# Patient Record
Sex: MALE | Race: ASIAN | NOT HISPANIC OR LATINO
[De-identification: names, ages, dates, MRNs, and addresses within clinical notes are randomized per-mention and may not be internally consistent; named-entity substitution may affect disease eponyms.]

---

## 2020-03-09 PROBLEM — Z00.00 ENCOUNTER FOR PREVENTIVE HEALTH EXAMINATION: Status: ACTIVE | Noted: 2020-03-09

## 2020-03-12 ENCOUNTER — APPOINTMENT (OUTPATIENT)
Dept: THORACIC SURGERY | Facility: CLINIC | Age: 60
End: 2020-03-12
Payer: MEDICAID

## 2020-03-12 VITALS
RESPIRATION RATE: 17 BRPM | SYSTOLIC BLOOD PRESSURE: 111 MMHG | TEMPERATURE: 97.5 F | HEART RATE: 69 BPM | DIASTOLIC BLOOD PRESSURE: 69 MMHG | WEIGHT: 183 LBS | BODY MASS INDEX: 28.72 KG/M2 | OXYGEN SATURATION: 98 % | HEIGHT: 67 IN

## 2020-03-12 DIAGNOSIS — Z72.89 OTHER PROBLEMS RELATED TO LIFESTYLE: ICD-10-CM

## 2020-03-12 DIAGNOSIS — Z86.79 PERSONAL HISTORY OF OTHER DISEASES OF THE CIRCULATORY SYSTEM: ICD-10-CM

## 2020-03-12 DIAGNOSIS — F17.200 NICOTINE DEPENDENCE, UNSPECIFIED, UNCOMPLICATED: ICD-10-CM

## 2020-03-12 PROCEDURE — 99205 OFFICE O/P NEW HI 60 MIN: CPT

## 2020-03-17 PROBLEM — Z72.89 ALCOHOL USE: Status: ACTIVE | Noted: 2020-03-17

## 2020-03-17 PROBLEM — Z86.79 HISTORY OF HYPERTENSION: Status: RESOLVED | Noted: 2020-03-17 | Resolved: 2020-03-17

## 2020-03-17 PROBLEM — F17.200 CURRENT SMOKER: Status: ACTIVE | Noted: 2020-03-17

## 2020-03-17 RX ORDER — UMECLIDINIUM BROMIDE AND VILANTEROL TRIFENATATE 62.5; 25 UG/1; UG/1
62.5-25 POWDER RESPIRATORY (INHALATION)
Refills: 0 | Status: ACTIVE | COMMUNITY

## 2020-03-17 RX ORDER — LOSARTAN POTASSIUM AND HYDROCHLOROTHIAZIDE 12.5; 5 MG/1; MG/1
50-12.5 TABLET ORAL
Refills: 0 | Status: ACTIVE | COMMUNITY

## 2020-03-18 NOTE — CONSULT LETTER
[Dear  ___] : Dear  [unfilled], [Consult Letter:] : I had the pleasure of evaluating your patient, [unfilled]. [( Thank you for referring [unfilled] for consultation for _____ )] : Thank you for referring [unfilled] for consultation for [unfilled] [Please see my note below.] : Please see my note below. [Consult Closing:] : Thank you very much for allowing me to participate in the care of this patient.  If you have any questions, please do not hesitate to contact me. [Sincerely,] : Sincerely, [DrSamm  ___] : Dr. BLAKE [FreeTextEntry2] : Scott Caballero MD (Pulm/ref)\gallo Ronquillo MD (PCP) [FreeTextEntry3] : Ghassan Haque MD, MPH \par System Director of Thoracic Surgery \par Director of Comprehensive Lung and Foregut [place] \par Professor Cardiovascular & Thoracic Surgery  \par [facility] [employer] at [facility]\par

## 2020-03-18 NOTE — DATA REVIEWED
[FreeTextEntry1] : CT Chest on 12/8/19:\par - there are ill-defined small randomly distributed nodules in JAY, including a confluent 2.2 cm area of masslike opacity in medial apical region\par - minimal patchy scarring noted in both lungs\par \par PET/CT on 3/4/20:\par - hypermetabolic focus in the superior pole of the Rt thyroid lobe with SUV=8.6\par - 1.6 cm nodule with irregular margins in the Lt apex, SUVmax=14.3 (4: 43), adjacent to it, multiple confluent opacity, SUV=5.3\par - a few small areas of subsegmental atelectasis/scarring in both lungs\par - some nonspecific foci of hypermetabolic in the mediastinum and hilar. SUV=2.8 to Rt paratracheal/precarinal LN, possibly reactive\par - scattered findings of colonic diverticulosis w/o diverticulitis, worse in the sigmoid colon\par \par PFTs on 3/9/20: FVC 87%, FEV1 74%, DLCO 73%

## 2020-03-18 NOTE — ASSESSMENT
[FreeTextEntry1] : Mr. MARTIN WOMACK, 60 year old male, current smoker, w/ hx of HTN, who presented to PCP for routine physical exam, sent for CT lung cancer screening due to smoking hx.\par \par CT Chest on 12/8/19:\par - there are ill-defined small randomly distributed nodules in JAY, including a confluent 2.2 cm area of masslike opacity in medial apical region\par - minimal patchy scarring noted in both lungs\par \par PET/CT on 3/4/20:\par - hypermetabolic focus in the superior pole of the Rt thyroid lobe with SUV=8.6\par - 1.6 cm nodule with irregular margins in the Lt apex, SUVmax=14.3 (4: 43), adjacent to it, multiple confluent opacity, SUV=5.3\par - a few small areas of subsegmental atelectasis/scarring in both lungs\par - some nonspecific foci of hypermetabolic in the mediastinum and hilar. SUV=2.8 to Rt paratracheal/precarinal LN, possibly reactive\par - scattered findings of colonic diverticulosis w/o diverticulitis, worse in the sigmoid colon\par \par I have reviewed the patient's medical records and diagnostic images at time of this office consultation and have made the following recommendation:\par 1. FNA of JAY\par 2. RTC to review result/path, will discuss about Lt VATS Robotic-assisted, JAY wedge rxn, possible LULobectomy, MLND.\par \par \par I personally performed the services described in the documentation, reviewed the documentation recorded by the scribe in my presence and it accurately and completely records my words and actions. \par \par I, Teddy Freeman, SYMONE, am scribing for and the presence of Dr. Ghassan Haque the following sections, HISTORY OF PRESENT ILLNESS, PAST MEDICAL/FAMILY/SOCIAL HISTORY; REVIEW OF SYSTEMS; VITAL SIGNS; PHYSICAL EXAM; DISPOSITION.\par

## 2020-03-18 NOTE — HISTORY OF PRESENT ILLNESS
[FreeTextEntry1] : Mr. MARTIN WOMACK, 60 year old male, current smoker, w/ hx of HTN, who presented to PCP for routine physical exam, sent for CT lung cancer screening due to smoking hx.\par \par CT Chest on 12/8/19:\par - there are ill-defined small randomly distributed nodules in JAY, including a confluent 2.2 cm area of masslike opacity in medial apical region\par - minimal patchy scarring noted in both lungs\par \par Completed a course of Clarithromycin 500mg PO BID x 10 days in Jan 2020.\par \par PET/CT on 3/4/20:\par - hypermetabolic focus in the superior pole of the Rt thyroid lobe with SUV=8.6\par - 1.6 cm nodule with irregular margins in the Lt apex, SUVmax=14.3 (4: 43), adjacent to it, multiple confluent opacity, SUV=5.3\par - a few small areas of subsegmental atelectasis/scarring in both lungs\par - some nonspecific foci of hypermetabolic in the mediastinum and hilar. SUV=2.8 to Rt paratracheal/precarinal LN, possibly reactive\par - scattered findings of colonic diverticulosis w/o diverticulitis, worse in the sigmoid colon\par \par PFTs on 3/9/20: FVC 87%, FEV1 74%, DLCO 73%\par \par AFB x3 from Feb 2020 are negative. \par \par Pt presents today for CT Sx consultation, referred by Dr. Scott Caballero. Denies SOB, CP, cough.\par

## 2022-08-18 ENCOUNTER — APPOINTMENT (OUTPATIENT)
Dept: THORACIC SURGERY | Facility: CLINIC | Age: 62
End: 2022-08-18

## 2022-08-18 VITALS
BODY MASS INDEX: 29.38 KG/M2 | OXYGEN SATURATION: 98 % | HEART RATE: 72 BPM | RESPIRATION RATE: 18 BRPM | DIASTOLIC BLOOD PRESSURE: 81 MMHG | WEIGHT: 187.6 LBS | TEMPERATURE: 98 F | SYSTOLIC BLOOD PRESSURE: 124 MMHG

## 2022-08-18 PROCEDURE — 99215 OFFICE O/P EST HI 40 MIN: CPT

## 2022-08-22 NOTE — ASSESSMENT
[FreeTextEntry1] : Mr. MARTIN WOMACK, 62 year old male, current smoker, w/ hx of HTN, who presented to PCP for routine physical exam, sent for CT lung cancer screening due to smoking hx.\par \par CT Chest on 12/8/19:\par - there are ill-defined small randomly distributed nodules in JAY, including a confluent 2.2 cm area of masslike opacity in medial apical region\par - minimal patchy scarring noted in both lungs\par \par Completed a course of Clarithromycin 500mg PO BID x 10 days in Jan 2020.\par \par PET/CT on 3/4/20:\par - hypermetabolic focus in the superior pole of the Rt thyroid lobe with SUV=8.6\par - 1.6 cm nodule with irregular margins in the Lt apex, SUVmax=14.3 (4: 43), adjacent to it, multiple confluent opacity, SUV=5.3\par - a few small areas of subsegmental atelectasis/scarring in both lungs\par - some nonspecific foci of hypermetabolic in the mediastinum and hilar. SUV=2.8 to Rt paratracheal/precarinal LN, possibly reactive\par - scattered findings of colonic diverticulosis w/o diverticulitis, worse in the sigmoid colon\par \par PFTs on 3/9/20: FVC 87%, FEV1 74%, DLCO 73%\par \par AFB x3 from Feb 2020 are negative. \par \par Pt lost follow up due to pandemic. \par \par CT Chest on 8/7/22:\par - again noted confluent reticulonodular opacities within the JAY predominant in a peribronchial distribution extending from the suprahilar region to the apex with interval increased in solid component, 1.3 cm now, previously 8 mm\par - JAY subpleural nodule is smaller 1.2 cm, previously 1.6 cm \par \par I have reviewed the patient's medical records and diagnostic images at time of this office consultation and have made the following recommendation:\par 1. CT reviewed, interval increased in size JAY lung nodule, concerning for malignancy. I would like to refer pt to Dr. Resendez for navigational bronchoscopy. \par 2. PFTs\par 3. RTC after bronch\par \par \par I personally performed the services described in the documentation, reviewed the documentation recorded by the scribe in my presence and it accurately and completely records my words and actions. \par \par I, Brigid Ibrahim NP, am scribing for and the presence of LIS Prajapati, the following sections HISTORY OF PRESENT ILLNESS, PAST MEDICAL/FAMILY/SOCIAL HISTORY; REVIEW OF SYSTEMS; VITAL SIGNS; PHYSICAL EXAM; DISPOSITION.\par

## 2022-08-22 NOTE — HISTORY OF PRESENT ILLNESS
[FreeTextEntry1] : Mr. MARTIN WOMACK, 62 year old male, current smoker, w/ hx of HTN, who presented to PCP for routine physical exam, sent for CT lung cancer screening due to smoking hx.\par \par CT Chest on 12/8/19:\par - there are ill-defined small randomly distributed nodules in JAY, including a confluent 2.2 cm area of masslike opacity in medial apical region\par - minimal patchy scarring noted in both lungs\par \par Completed a course of Clarithromycin 500mg PO BID x 10 days in Jan 2020.\par \par PET/CT on 3/4/20:\par - hypermetabolic focus in the superior pole of the Rt thyroid lobe with SUV=8.6\par - 1.6 cm nodule with irregular margins in the Lt apex, SUVmax=14.3 (4: 43), adjacent to it, multiple confluent opacity, SUV=5.3\par - a few small areas of subsegmental atelectasis/scarring in both lungs\par - some nonspecific foci of hypermetabolic in the mediastinum and hilar. SUV=2.8 to Rt paratracheal/precarinal LN, possibly reactive\par - scattered findings of colonic diverticulosis w/o diverticulitis, worse in the sigmoid colon\par \par PFTs on 3/9/20: FVC 87%, FEV1 74%, DLCO 73%\par \par AFB x3 from Feb 2020 are negative. \par \par Pt lost follow up due to pandemic. \par \par CT Chest on 8/7/22:\par - again noted confluent reticulonodular opacities within the JAY predominant in a peribronchial distribution extending from the suprahilar region to the apex with interval increased in solid component, 1.3 cm now, previously 8 mm\par - JAY subpleural nodule is smaller 1.2 cm, previously 1.6 cm \par \par Pt presents today for follow up.\par \par

## 2022-08-22 NOTE — CONSULT LETTER
[Dear  ___] : Dear  [unfilled], [Consult Letter:] : I had the pleasure of evaluating your patient, [unfilled]. [( Thank you for referring [unfilled] for consultation for _____ )] : Thank you for referring [unfilled] for consultation for [unfilled] [Please see my note below.] : Please see my note below. [Consult Closing:] : Thank you very much for allowing me to participate in the care of this patient.  If you have any questions, please do not hesitate to contact me. [Sincerely,] : Sincerely, [DrSamm  ___] : Dr. BLAKE [FreeTextEntry2] : Scott Caballero MD (Pulm/ref)\gallo Ronquillo MD (PCP) [FreeTextEntry3] : Ghassan Haque MD, MPH \par System Director of Thoracic Surgery \par Director of Comprehensive Lung and Foregut [place] \par Professor Cardiovascular & Thoracic Surgery  \par [facility] [employer] at [facility]\par

## 2022-09-12 ENCOUNTER — APPOINTMENT (OUTPATIENT)
Dept: PULMONOLOGY | Facility: CLINIC | Age: 62
End: 2022-09-12

## 2022-09-12 VITALS
DIASTOLIC BLOOD PRESSURE: 80 MMHG | SYSTOLIC BLOOD PRESSURE: 143 MMHG | OXYGEN SATURATION: 97 % | BODY MASS INDEX: 29.82 KG/M2 | TEMPERATURE: 98.3 F | HEIGHT: 67 IN | HEART RATE: 80 BPM | WEIGHT: 190 LBS

## 2022-09-12 PROCEDURE — 99205 OFFICE O/P NEW HI 60 MIN: CPT

## 2022-09-15 LAB
ANION GAP SERPL CALC-SCNC: 24 MMOL/L
BASOPHILS # BLD AUTO: 0.04 K/UL
BASOPHILS NFR BLD AUTO: 0.6 %
BUN SERPL-MCNC: 18 MG/DL
CALCIUM SERPL-MCNC: 10 MG/DL
CHLORIDE SERPL-SCNC: 101 MMOL/L
CO2 SERPL-SCNC: 17 MMOL/L
CREAT SERPL-MCNC: 0.78 MG/DL
EGFR: 102 ML/MIN/1.73M2
EOSINOPHIL # BLD AUTO: 0.08 K/UL
EOSINOPHIL NFR BLD AUTO: 1.3 %
GLUCOSE SERPL-MCNC: 82 MG/DL
HCT VFR BLD CALC: 45.9 %
HGB BLD-MCNC: 14.7 G/DL
IMM GRANULOCYTES NFR BLD AUTO: 0.5 %
LYMPHOCYTES # BLD AUTO: 1.89 K/UL
LYMPHOCYTES NFR BLD AUTO: 29.8 %
MAN DIFF?: NORMAL
MCHC RBC-ENTMCNC: 32 GM/DL
MCHC RBC-ENTMCNC: 32.9 PG
MCV RBC AUTO: 102.7 FL
MONOCYTES # BLD AUTO: 0.47 K/UL
MONOCYTES NFR BLD AUTO: 7.4 %
NEUTROPHILS # BLD AUTO: 3.84 K/UL
NEUTROPHILS NFR BLD AUTO: 60.4 %
PLATELET # BLD AUTO: 207 K/UL
POTASSIUM SERPL-SCNC: 4.3 MMOL/L
RBC # BLD: 4.47 M/UL
RBC # FLD: 12.7 %
SODIUM SERPL-SCNC: 141 MMOL/L
WBC # FLD AUTO: 6.35 K/UL

## 2022-09-19 NOTE — CONSULT LETTER
[Dear  ___] : Dear  [unfilled], [Consult Letter:] : I had the pleasure of evaluating your patient, [unfilled]. [Please see my note below.] : Please see my note below. [Consult Closing:] : Thank you very much for allowing me to participate in the care of this patient.  If you have any questions, please do not hesitate to contact me. [Sincerely,] : Sincerely, [FreeTextEntry3] : Ehsan Resendez MD\par Director of Interventional Pulmonology & Bronchoscopy\par . \par Division of Pulmonary, Critical Care & Sleep Medicine\par [facility] [employer] at [facility].\par \par

## 2022-09-19 NOTE — ASSESSMENT
[FreeTextEntry1] : 62 M PMH active tobacco use and HTN presenting for evaluation of JAY opacity. Currently asymptomatic though with enlarging nodule and previous uptake on prior pet CT. Differential at this time is concerning for malignant vs. infectious vs. inflammatory\par \par - Will assess to ensure the CT is compatible with the navigation software. \par - will schedule for navigational bronchoscopy with EBUS and lymph node biopsy\par - pre-operative labs\par - PFTs 8/2022 reviewed; preserved FEV1/FVC with mildly reduced DLCO\par \par \par The diagnostic yield of robotic assisted navigation assisted bronchoscopy with biopsy as well as EBUS with biopsy was discussed with the patient. The risk and benefits of bronchoscopy with navigation assisted transbronchial biopsy including risk of bleeding and  risk pneumothorax was discussed with the patient and they demonstrated understanding. In case of pneumothorax, we discussed the need for in hospital monitoring and chest tube placement. In case of bleeding, we explained that they may require inpatient or ICU admission. In case the lesion is suspicious for malignancy on preliminary or there is mediastinal or hilar adenopathy, the patient will need staging of the mediastinum with EBUS guided TBNA in the same procedure. The risk and benefits of EBUS including the risk of bleeding and risk of pneumothorax associated with EBUS was discussed with the patient and he demonstrated understanding. We will also send the tissue/BAL for culture to assess for infectious etiology during the procedure. The patient is agreeable to proceed with a navigation assisted bronchoscopy with biopsy of the lung lesion and EBUS with TBNA. The patient will undergo PST to assess candidacy of general anesthesia as well as discuss the risks and benefits with the anesthesiologist. Educational reading material regarding the procedure, risks and benefits provided to the patient in paper format. \par \par Will need COVID swab and presurgical testing prior to scheduling the procedure. The office will co-ordinate testing and pre-surgical appointment.\par \par \par \par

## 2022-09-19 NOTE — REVIEW OF SYSTEMS
[Negative] : Endocrine [Fever] : no fever [Dry Eyes] : no dry eyes [Cough] : no cough [Sputum] : no sputum [Dyspnea] : no dyspnea [Chest Discomfort] : no chest discomfort [Orthopnea] : no orthopnea [Palpitations] : no palpitations [Depression] : no depression [Diabetes] : no diabetes

## 2022-09-19 NOTE — DISCUSSION/SUMMARY
[FreeTextEntry1] : The patients most recent CT scan was reviewed by my independently and my interpretation is stated below:\par \par CT Chest on 12/8/19:\par - there are ill-defined small randomly distributed nodules in JAY, including a confluent 2.2 cm area of masslike opacity in medial apical region\par - minimal patchy scarring noted in both lungs\par \par Completed a course of Clarithromycin 500mg PO BID x 10 days in Jan 2020.\par \par PET/CT on 3/4/20:\par - hypermetabolic focus in the superior pole of the Rt thyroid lobe with SUV=8.6\par - 1.6 cm nodule with irregular margins in the Lt apex, SUVmax=14.3 (4: 43), adjacent to it, multiple confluent opacity, SUV=5.3\par - a few small areas of subsegmental atelectasis/scarring in both lungs\par - some nonspecific foci of hypermetabolic in the mediastinum and hilar. SUV=2.8 to Rt paratracheal/precarinal LN, possibly reactive\par - scattered findings of colonic diverticulosis w/o diverticulitis, worse in the sigmoid colon\par

## 2022-09-19 NOTE — HISTORY OF PRESENT ILLNESS
[Former] : former [TextBox_4] : Interventional Pulmonology Consultation/Visit Note\par \par \par 62 M PMH active tobacco use and HTN presenting for evaluation of JAY opacity. Patient initially seen by PCP for routine physical exam and underwent CT chest on 12/8/19 with evidence of confluent 2.2cm masslike opacity in JAY with small ill defined nodules. PET CT from March 2020 with hypermetabolic focus in Rt thyroid lobe with uptake in 1.6cm nodule in left apex with enhancement of adjacent confluent opacity as well as nonspecific foci of hypermetabolic mediastinal/hilar nodes. Lost to follow up due to pandemic though repeat CT 8/7/22 showing confluent reticulonodular opacities in JAY with interval increase in solid component from 8mm to 13mm. Referred by Dr. Haque for navigational bronchoscopy. \par Currently reports that he is asymptomatic, no fevers, chills, shortness of breath, cough, chest pain, weight loss, cough. \par \par Born in [place] emigrated in [place]. No recent travel. No history of childhood infections. Denies TB history. No Fhx of lung conditions. \par \par Shx \par 1ppd x since age 22

## 2022-10-04 ENCOUNTER — APPOINTMENT (OUTPATIENT)
Dept: PULMONOLOGY | Facility: HOSPITAL | Age: 62
End: 2022-10-04

## 2022-10-05 ENCOUNTER — NON-APPOINTMENT (OUTPATIENT)
Age: 62
End: 2022-10-05

## 2022-10-11 ENCOUNTER — APPOINTMENT (OUTPATIENT)
Dept: PULMONOLOGY | Facility: HOSPITAL | Age: 62
End: 2022-10-11

## 2022-12-01 ENCOUNTER — OUTPATIENT (OUTPATIENT)
Dept: OUTPATIENT SERVICES | Facility: HOSPITAL | Age: 62
LOS: 1 days | End: 2022-12-01

## 2022-12-01 VITALS
HEART RATE: 66 BPM | DIASTOLIC BLOOD PRESSURE: 84 MMHG | TEMPERATURE: 97 F | HEIGHT: 65 IN | WEIGHT: 186.95 LBS | RESPIRATION RATE: 16 BRPM | OXYGEN SATURATION: 98 % | SYSTOLIC BLOOD PRESSURE: 146 MMHG

## 2022-12-01 DIAGNOSIS — I10 ESSENTIAL (PRIMARY) HYPERTENSION: ICD-10-CM

## 2022-12-01 DIAGNOSIS — R91.8 OTHER NONSPECIFIC ABNORMAL FINDING OF LUNG FIELD: ICD-10-CM

## 2022-12-01 DIAGNOSIS — R59.0 LOCALIZED ENLARGED LYMPH NODES: ICD-10-CM

## 2022-12-01 DIAGNOSIS — Z98.890 OTHER SPECIFIED POSTPROCEDURAL STATES: Chronic | ICD-10-CM

## 2022-12-01 LAB
ALBUMIN SERPL ELPH-MCNC: 4.7 G/DL — SIGNIFICANT CHANGE UP (ref 3.3–5)
ALP SERPL-CCNC: 65 U/L — SIGNIFICANT CHANGE UP (ref 40–120)
ALT FLD-CCNC: 43 U/L — HIGH (ref 4–41)
ANION GAP SERPL CALC-SCNC: 12 MMOL/L — SIGNIFICANT CHANGE UP (ref 7–14)
AST SERPL-CCNC: 43 U/L — HIGH (ref 4–40)
BILIRUB SERPL-MCNC: 0.4 MG/DL — SIGNIFICANT CHANGE UP (ref 0.2–1.2)
BUN SERPL-MCNC: 16 MG/DL — SIGNIFICANT CHANGE UP (ref 7–23)
CALCIUM SERPL-MCNC: 9.7 MG/DL — SIGNIFICANT CHANGE UP (ref 8.4–10.5)
CHLORIDE SERPL-SCNC: 100 MMOL/L — SIGNIFICANT CHANGE UP (ref 98–107)
CO2 SERPL-SCNC: 26 MMOL/L — SIGNIFICANT CHANGE UP (ref 22–31)
CREAT SERPL-MCNC: 0.75 MG/DL — SIGNIFICANT CHANGE UP (ref 0.5–1.3)
EGFR: 103 ML/MIN/1.73M2 — SIGNIFICANT CHANGE UP
GLUCOSE SERPL-MCNC: 70 MG/DL — SIGNIFICANT CHANGE UP (ref 70–99)
HCT VFR BLD CALC: 46.3 % — SIGNIFICANT CHANGE UP (ref 39–50)
HGB BLD-MCNC: 15.5 G/DL — SIGNIFICANT CHANGE UP (ref 13–17)
MCHC RBC-ENTMCNC: 33.4 PG — SIGNIFICANT CHANGE UP (ref 27–34)
MCHC RBC-ENTMCNC: 33.5 GM/DL — SIGNIFICANT CHANGE UP (ref 32–36)
MCV RBC AUTO: 99.8 FL — SIGNIFICANT CHANGE UP (ref 80–100)
NRBC # BLD: 0 /100 WBCS — SIGNIFICANT CHANGE UP (ref 0–0)
NRBC # FLD: 0 K/UL — SIGNIFICANT CHANGE UP (ref 0–0)
PLATELET # BLD AUTO: 214 K/UL — SIGNIFICANT CHANGE UP (ref 150–400)
POTASSIUM SERPL-MCNC: 4.5 MMOL/L — SIGNIFICANT CHANGE UP (ref 3.5–5.3)
POTASSIUM SERPL-SCNC: 4.5 MMOL/L — SIGNIFICANT CHANGE UP (ref 3.5–5.3)
PROT SERPL-MCNC: 7.8 G/DL — SIGNIFICANT CHANGE UP (ref 6–8.3)
RBC # BLD: 4.64 M/UL — SIGNIFICANT CHANGE UP (ref 4.2–5.8)
RBC # FLD: 11.9 % — SIGNIFICANT CHANGE UP (ref 10.3–14.5)
SODIUM SERPL-SCNC: 138 MMOL/L — SIGNIFICANT CHANGE UP (ref 135–145)
WBC # BLD: 5.37 K/UL — SIGNIFICANT CHANGE UP (ref 3.8–10.5)
WBC # FLD AUTO: 5.37 K/UL — SIGNIFICANT CHANGE UP (ref 3.8–10.5)

## 2022-12-01 RX ORDER — SODIUM CHLORIDE 9 MG/ML
1000 INJECTION, SOLUTION INTRAVENOUS
Refills: 0 | Status: DISCONTINUED | OUTPATIENT
Start: 2022-12-13 | End: 2022-12-27

## 2022-12-01 NOTE — H&P PST ADULT - ACTIVITY
Patient continues to be admitted to SNF at [facility] with date of discharge undetermined. Patient has been out of Acute care for greated than 30 days and remains in SNF. Episode closed at this time. Pt states he can walk 3 to 4 blocks or climb 2 flights stairs without SOB or CP

## 2022-12-01 NOTE — H&P PST ADULT - HISTORY OF PRESENT ILLNESS
Pt is  Pt is a 62 yr old male Mandarin speaking who is scheduled for [place] Robotic Navigational Endobronchial U/S Bronchoscopy Radial Endobronchial U/S with cytology with Dr Resendez tentatively 12/13/22 - pt seen with daughter who assisted with translation - pt had CT scan 2019 and JAY mass noted - pt has smoking hx - 2020 pt also noted to have thyroid and mediastinal node involvement and now for biopsy - pt denies SOB or cough. Hx HTN -   Pt postponed from same procedure in 10/22 needing Cardio evaluation - CC in chart now   Pt given information for COVID PCR testing sites and told to make appointment 3-5 days preop

## 2022-12-01 NOTE — H&P PST ADULT - PROBLEM SELECTOR PLAN 1
Pt scheduled for surgery and preop instructions including instructions for taking Famotidine on the day of surgery, given verbally and with use of  written materials, and patient confirming understanding of such instructions using  teach back method.  Pt given information for COVID PCR testing sites and told to make appointment 3-5 days preop

## 2022-12-01 NOTE — H&P PST ADULT - LAST STRESS TEST
did not have in 10/22 but recommended if further surgery needed  - clearance given for current procedure

## 2022-12-12 ENCOUNTER — TRANSCRIPTION ENCOUNTER (OUTPATIENT)
Age: 62
End: 2022-12-12

## 2022-12-13 ENCOUNTER — TRANSCRIPTION ENCOUNTER (OUTPATIENT)
Age: 62
End: 2022-12-13

## 2022-12-13 ENCOUNTER — APPOINTMENT (OUTPATIENT)
Dept: PULMONOLOGY | Facility: HOSPITAL | Age: 62
End: 2022-12-13

## 2022-12-13 ENCOUNTER — RESULT REVIEW (OUTPATIENT)
Age: 62
End: 2022-12-13

## 2022-12-13 ENCOUNTER — OUTPATIENT (OUTPATIENT)
Dept: OUTPATIENT SERVICES | Facility: HOSPITAL | Age: 62
LOS: 1 days | Discharge: HOME | End: 2022-12-13

## 2022-12-13 VITALS
OXYGEN SATURATION: 100 % | RESPIRATION RATE: 16 BRPM | SYSTOLIC BLOOD PRESSURE: 111 MMHG | HEART RATE: 72 BPM | DIASTOLIC BLOOD PRESSURE: 73 MMHG

## 2022-12-13 VITALS
HEIGHT: 65 IN | RESPIRATION RATE: 16 BRPM | OXYGEN SATURATION: 100 % | SYSTOLIC BLOOD PRESSURE: 135 MMHG | TEMPERATURE: 98 F | WEIGHT: 186.95 LBS | HEART RATE: 73 BPM | DIASTOLIC BLOOD PRESSURE: 79 MMHG

## 2022-12-13 DIAGNOSIS — R59.0 LOCALIZED ENLARGED LYMPH NODES: ICD-10-CM

## 2022-12-13 DIAGNOSIS — Z98.890 OTHER SPECIFIED POSTPROCEDURAL STATES: Chronic | ICD-10-CM

## 2022-12-13 LAB
B PERT IGG+IGM PNL SER: ABNORMAL
COLOR FLD: ABNORMAL
EOSINOPHIL # FLD: 2 % — SIGNIFICANT CHANGE UP
LYMPHOCYTES # FLD: 13 % — SIGNIFICANT CHANGE UP
MESOTHL CELL # FLD: 15 % — SIGNIFICANT CHANGE UP
MONOS+MACROS # FLD: 27 % — SIGNIFICANT CHANGE UP
NEUTROPHILS-BODY FLUID: 14 % — SIGNIFICANT CHANGE UP
NIGHT BLUE STAIN TISS: SIGNIFICANT CHANGE UP
OTHER CELLS FLD MANUAL: 29 % — SIGNIFICANT CHANGE UP
RCV VOL RI: SIGNIFICANT CHANGE UP CELLS/UL (ref 0–5)
SPECIMEN SOURCE FLD: SIGNIFICANT CHANGE UP
SPECIMEN SOURCE: SIGNIFICANT CHANGE UP
TOTAL NUCLEATED CELL COUNT, BODY FLUID: SIGNIFICANT CHANGE UP CELLS/UL (ref 0–5)

## 2022-12-13 PROCEDURE — 31629 BRONCHOSCOPY/NEEDLE BX EACH: CPT | Mod: GC

## 2022-12-13 PROCEDURE — 71045 X-RAY EXAM CHEST 1 VIEW: CPT | Mod: 26

## 2022-12-13 PROCEDURE — 31628 BRONCHOSCOPY/LUNG BX EACH: CPT | Mod: GC

## 2022-12-13 PROCEDURE — 31627 NAVIGATIONAL BRONCHOSCOPY: CPT | Mod: GC

## 2022-12-13 PROCEDURE — 31653 BRONCH EBUS SAMPLNG 3/> NODE: CPT | Mod: GC

## 2022-12-13 PROCEDURE — 31654 BRONCH EBUS IVNTJ PERPH LES: CPT | Mod: GC

## 2022-12-13 PROCEDURE — 88172 CYTP DX EVAL FNA 1ST EA SITE: CPT | Mod: 26

## 2022-12-13 PROCEDURE — 31624 DX BRONCHOSCOPE/LAVAGE: CPT | Mod: GC

## 2022-12-13 PROCEDURE — 88112 CYTOPATH CELL ENHANCE TECH: CPT | Mod: 26,59

## 2022-12-13 PROCEDURE — 31645 BRNCHSC W/THER ASPIR 1ST: CPT | Mod: GC

## 2022-12-13 PROCEDURE — 88305 TISSUE EXAM BY PATHOLOGIST: CPT | Mod: 26

## 2022-12-13 PROCEDURE — 88173 CYTOPATH EVAL FNA REPORT: CPT | Mod: 26

## 2022-12-13 RX ORDER — LOSARTAN/HYDROCHLOROTHIAZIDE 100MG-25MG
1 TABLET ORAL
Qty: 0 | Refills: 0 | DISCHARGE

## 2022-12-13 RX ORDER — FENTANYL CITRATE 50 UG/ML
25 INJECTION INTRAVENOUS
Refills: 0 | Status: DISCONTINUED | OUTPATIENT
Start: 2022-12-13 | End: 2022-12-13

## 2022-12-13 RX ORDER — ASPIRIN/CALCIUM CARB/MAGNESIUM 324 MG
0 TABLET ORAL
Qty: 0 | Refills: 0 | DISCHARGE

## 2022-12-13 RX ORDER — THIAMINE MONONITRATE (VIT B1) 100 MG
1 TABLET ORAL
Qty: 0 | Refills: 0 | DISCHARGE

## 2022-12-13 RX ORDER — FOLIC ACID 0.8 MG
1 TABLET ORAL
Qty: 0 | Refills: 0 | DISCHARGE

## 2022-12-13 RX ORDER — ONDANSETRON 8 MG/1
4 TABLET, FILM COATED ORAL ONCE
Refills: 0 | Status: DISCONTINUED | OUTPATIENT
Start: 2022-12-13 | End: 2022-12-27

## 2022-12-13 RX ORDER — OXYCODONE HYDROCHLORIDE 5 MG/1
5 TABLET ORAL ONCE
Refills: 0 | Status: DISCONTINUED | OUTPATIENT
Start: 2022-12-13 | End: 2022-12-13

## 2022-12-13 RX ORDER — FENTANYL CITRATE 50 UG/ML
50 INJECTION INTRAVENOUS
Refills: 0 | Status: DISCONTINUED | OUTPATIENT
Start: 2022-12-13 | End: 2022-12-13

## 2022-12-13 NOTE — BRIEF OPERATIVE NOTE - OPERATION/FINDINGS
Tbbx taken of JAY with robotic assisted navigational bronchoscopy. EBUS with lymph node biopsy of 4R, 7 and 11L were performed. Flexible bronchoscopy performed initially, pig bronchus and JAY with anterior, apical and posterior segments noted. Tbbx taken of JAY with robotic assisted navigational bronchoscopy. EBUS with lymph node biopsy of 4R, 7 and 11L were performed. Flexible bronchoscopy performed initially, pig bronchus and JAY with anterior, apical and posterior segments noted. Tbbx taken of JAY with robotic assisted navigational bronchoscopy. EBUS with lymph node biopsy of 4R, 7 and 11L were performed.    #[id number]

## 2022-12-13 NOTE — PACU DISCHARGE NOTE - PAIN:
Controlled with current regime [Arthralgia] : arthralgia [Joint Stiffness] : joint stiffness [Joint Pain] : joint pain [Joint Swelling] : joint swelling

## 2022-12-13 NOTE — ASU DISCHARGE PLAN (ADULT/PEDIATRIC) - NS MD DC FALL RISK RISK
For information on Fall & Injury Prevention, visit: [email or web address] OR  [email or web address] OR  https://www.cdc.gov/steadi/patient.html

## 2022-12-13 NOTE — ASU DISCHARGE PLAN (ADULT/PEDIATRIC) - CARE PROVIDER_API CALL
Ehsan Resendez)  Critical Care Medicine; Internal Medicine; Pulmonary Disease  [address] [place] [address]  Phone: [phone]  Fax: [phone]  Established Patient  Follow Up Time:

## 2022-12-13 NOTE — BRIEF OPERATIVE NOTE - NSICDXBRIEFPROCEDURE_GEN_ALL_CORE_FT
PROCEDURES:  Navigational bronchoscopy 13-Dec-2022 10:09:02  Lejeune, Derek  Endobronchial ultrasound during diagnostic bronchoscopy 13-Dec-2022 10:09:22  Lejeune, Derek  Bronchoscopy, with transbronchial lung biopsy 13-Dec-2022 10:10:18  Lejeune, Derek

## 2022-12-13 NOTE — ASU DISCHARGE PLAN (ADULT/PEDIATRIC) - PROCEDURE
[place] assisted navigational bronchoscopy, lung biopsy, endobronchial ultrasound, lymph node biopsy [place] assisted navigational bronchoscopy, lung biopsy, endobronchial ultrasound, lymph node biopsy

## 2022-12-13 NOTE — ASU DISCHARGE PLAN (ADULT/PEDIATRIC) - CALL YOUR DOCTOR IF YOU HAVE ANY OF THE FOLLOWING:
Shortness of breath, chest pain/Bleeding that does not stop/Pain not relieved by Medications/Fever greater than (need to indicate Fahrenheit or Celsius)/Nausea and vomiting that does not stop/Inability to tolerate liquids or foods

## 2022-12-15 LAB
CULTURE RESULTS: SIGNIFICANT CHANGE UP
SPECIMEN SOURCE: SIGNIFICANT CHANGE UP

## 2022-12-18 LAB — NON-GYNECOLOGICAL CYTOLOGY STUDY: SIGNIFICANT CHANGE UP

## 2022-12-19 PROBLEM — R91.8 OTHER NONSPECIFIC ABNORMAL FINDING OF LUNG FIELD: Chronic | Status: ACTIVE | Noted: 2022-12-01

## 2022-12-19 PROBLEM — I10 ESSENTIAL (PRIMARY) HYPERTENSION: Chronic | Status: ACTIVE | Noted: 2022-12-01

## 2022-12-19 PROBLEM — Z87.891 PERSONAL HISTORY OF NICOTINE DEPENDENCE: Chronic | Status: ACTIVE | Noted: 2022-12-01

## 2022-12-20 PROBLEM — J84.10 GRANULOMATOUS LUNG DISEASE: Status: ACTIVE | Noted: 2022-12-20

## 2022-12-22 ENCOUNTER — APPOINTMENT (OUTPATIENT)
Dept: THORACIC SURGERY | Facility: CLINIC | Age: 62
End: 2022-12-22

## 2022-12-22 VITALS
TEMPERATURE: 97.7 F | OXYGEN SATURATION: 97 % | SYSTOLIC BLOOD PRESSURE: 122 MMHG | HEART RATE: 90 BPM | BODY MASS INDEX: 29.29 KG/M2 | RESPIRATION RATE: 18 BRPM | DIASTOLIC BLOOD PRESSURE: 67 MMHG | WEIGHT: 187 LBS

## 2022-12-22 DIAGNOSIS — J84.10 PULMONARY FIBROSIS, UNSPECIFIED: ICD-10-CM

## 2022-12-22 PROCEDURE — 99214 OFFICE O/P EST MOD 30 MIN: CPT

## 2022-12-23 NOTE — DATA REVIEWED
[FreeTextEntry1] : I have independently reviewed the following:\par navigational bronchoscopy on 12/19/22

## 2022-12-23 NOTE — HISTORY OF PRESENT ILLNESS
[FreeTextEntry1] : Mr. MARTIN WOMACK, 62 year old male, current smoker, w/ hx of HTN, who presented to PCP for routine physical exam, sent for CT lung cancer screening due to smoking hx.\par \par CT Chest on 12/8/19:\par - there are ill-defined small randomly distributed nodules in JAY, including a confluent 2.2 cm area of masslike opacity in medial apical region\par - minimal patchy scarring noted in both lungs\par \par Completed a course of Clarithromycin 500mg PO BID x 10 days in Jan 2020.\par \par PET/CT on 3/4/20:\par - hypermetabolic focus in the superior pole of the Rt thyroid lobe with SUV=8.6\par - 1.6 cm nodule with irregular margins in the Lt apex, SUVmax=14.3 (4: 43), adjacent to it, multiple confluent opacity, SUV=5.3\par - a few small areas of subsegmental atelectasis/scarring in both lungs\par - some nonspecific foci of hypermetabolic in the mediastinum and hilar. SUV=2.8 to Rt paratracheal/precarinal LN, possibly reactive\par - scattered findings of colonic diverticulosis w/o diverticulitis, worse in the sigmoid colon\par \par PFTs on 3/9/20: FVC 87%, FEV1 74%, DLCO 73%\par \par AFB x3 from Feb 2020 are negative. \par \par Pt lost follow up due to pandemic. \par \par CT Chest on 8/7/22:\par - again noted confluent reticulonodular opacities within the JAY predominant in a peribronchial distribution extending from the suprahilar region to the apex with interval increased in solid component, 1.3 cm now, previously 8 mm\par - JAY subpleural nodule is smaller 1.2 cm, previously 1.6 cm \par \par PFTs on 8/10/22: FVC 94%, FEV1 78%, DLCO 72%\par \par Now s/p navigational bronchoscopy on 12/19/22. Path revealed negative for malignant cells, non necrotizing granulomatous inflammation. \par \par Pt presents today for follow up.  Denies SOB, CP, cough.\par

## 2022-12-23 NOTE — ASSESSMENT
[FreeTextEntry1] : Mr. MARTIN WOMACK, 62 year old male, current smoker, w/ hx of HTN, who presented to PCP for routine physical exam, sent for CT lung cancer screening due to smoking hx.\par \par Now s/p navigational bronchoscopy on 12/19/22. Path revealed negative for malignant cells, non necrotizing granulomatous inflammation. \par \par I have reviewed the patient's medical records and diagnostic images at time of this office consultation and have made the following recommendation:\par 1. Path reviewed and explained to patient, negative for malignancy, f/u with Dr. Caballero, [facility] in 3mo with CT Chest w/o contrast.\par \par \par I, Dr. HANNA, LIS [facility], personally performed the evaluation and management (E/M) services for this established patient who presents today with (a) new problem(s)/exacerbation of (an) existing condition(s). That E/M includes conducting the examination, assessing all new/exacerbated conditions, and establishing a new plan of care. Today, my ACP, Teddy Freeman NP was here to observe my evaluation and management services for this new problem/exacerbated condition to be followed going forward.\par \par

## 2022-12-23 NOTE — CONSULT LETTER
[Dear  ___] : Dear  [unfilled], [Consult Letter:] : I had the pleasure of evaluating your patient, [unfilled]. [( Thank you for referring [unfilled] for consultation for _____ )] : Thank you for referring [unfilled] for consultation for [unfilled] [Please see my note below.] : Please see my note below. [Consult Closing:] : Thank you very much for allowing me to participate in the care of this patient.  If you have any questions, please do not hesitate to contact me. [Sincerely,] : Sincerely, [DrSamm  ___] : Dr. BLAKE [FreeTextEntry2] : Scott Caballero MD (Pulm/ref)\gallo Ronquillo MD (PCP) [FreeTextEntry3] : Ghassan Haque MD, MPH \par System Director of Thoracic Surgery \par Director of Comprehensive Lung and Foregut [place] \par Professor Cardiovascular & Thoracic Surgery  \par [facility] [employer] at [facility]\par

## 2023-01-11 LAB
CULTURE RESULTS: SIGNIFICANT CHANGE UP
SPECIMEN SOURCE: SIGNIFICANT CHANGE UP

## 2023-01-28 LAB
CULTURE RESULTS: SIGNIFICANT CHANGE UP
SPECIMEN SOURCE: SIGNIFICANT CHANGE UP

## 2023-02-14 NOTE — H&P PST ADULT - NS SC CAGE ALCOHOL ANNOYED YOU
No new care gaps identified.  [facility] Embedded Care Gaps. Reference number: [id number]. 2/14/2023   9:05:52 AM CST   no

## 2023-03-30 ENCOUNTER — APPOINTMENT (OUTPATIENT)
Dept: THORACIC SURGERY | Facility: CLINIC | Age: 63
End: 2023-03-30
Payer: MEDICAID

## 2023-04-20 ENCOUNTER — APPOINTMENT (OUTPATIENT)
Dept: THORACIC SURGERY | Facility: CLINIC | Age: 63
End: 2023-04-20
Payer: MEDICAID

## 2023-04-27 ENCOUNTER — APPOINTMENT (OUTPATIENT)
Dept: THORACIC SURGERY | Facility: CLINIC | Age: 63
End: 2023-04-27
Payer: MEDICAID

## 2023-04-27 VITALS
SYSTOLIC BLOOD PRESSURE: 154 MMHG | DIASTOLIC BLOOD PRESSURE: 74 MMHG | OXYGEN SATURATION: 98 % | TEMPERATURE: 97.4 F | BODY MASS INDEX: 29.45 KG/M2 | HEART RATE: 83 BPM | WEIGHT: 188 LBS | RESPIRATION RATE: 18 BRPM

## 2023-04-27 DIAGNOSIS — R91.1 SOLITARY PULMONARY NODULE: ICD-10-CM

## 2023-04-27 PROCEDURE — 99214 OFFICE O/P EST MOD 30 MIN: CPT

## 2023-04-28 NOTE — ASSESSMENT
[FreeTextEntry1] : Mr. MARTIN WOMACK, 63 year old male, current smoker, w/ hx of HTN, who presented to PCP for routine physical exam, sent for CT lung cancer screening due to smoking hx.\par \par Now s/p navigational bronchoscopy on 12/19/22. Path revealed negative for malignant cells, non necrotizing granulomatous inflammation. \par \par CT Chest on 4/16/23 at [facility]:\par - JAY clustered reticulonodular lung opacities with a "reverse halo" morphology, including a increased discrete 8 mm solid nodule along the superior margin\par - multiple subcentimeter calcified granulomas within the lungs\par \par I have reviewed the patient's medical records and diagnostic images at time of this office consultation and have made the following recommendation:\par 1. CT scan showed stable lung findings, recommended patient to return to office in 6mo w/ CT Chest w/o contrast.\par \par \par I, Dr. HANNA, LIS JACQUES, personally performed the evaluation and management (E/M) services for this established patient who presents today with (a) new problem(s)/exacerbation of (an) existing condition(s). That E/M includes conducting the examination, assessing all new/exacerbated conditions, and establishing a new plan of care. Today, my ACP, Teddy Freeman NP was here to observe my evaluation and management services for this new problem/exacerbated condition to be followed going forward.\par \par

## 2023-04-28 NOTE — HISTORY OF PRESENT ILLNESS
[FreeTextEntry1] : Mr. MARTIN WOMACK, 63 year old male, current smoker, w/ hx of HTN, who presented to PCP for routine physical exam, sent for CT lung cancer screening due to smoking hx.\par \par CT Chest on 12/8/19:\par - there are ill-defined small randomly distributed nodules in JAY, including a confluent 2.2 cm area of masslike opacity in medial apical region\par - minimal patchy scarring noted in both lungs\par \par Completed a course of Clarithromycin 500mg PO BID x 10 days in Jan 2020.\par \par PET/CT on 3/4/20:\par - hypermetabolic focus in the superior pole of the Rt thyroid lobe with SUV=8.6\par - 1.6 cm nodule with irregular margins in the Lt apex, SUVmax=14.3 (4: 43), adjacent to it, multiple confluent opacity, SUV=5.3\par - a few small areas of subsegmental atelectasis/scarring in both lungs\par - some nonspecific foci of hypermetabolic in the mediastinum and hilar. SUV=2.8 to Rt paratracheal/precarinal LN, possibly reactive\par - scattered findings of colonic diverticulosis w/o diverticulitis, worse in the sigmoid colon\par \par PFTs on 3/9/20: FVC 87%, FEV1 74%, DLCO 73%\par \par AFB x3 from Feb 2020 are negative. \par \par Pt lost follow up due to pandemic. \par \par CT Chest on 8/7/22:\par - again noted confluent reticulonodular opacities within the JAY predominant in a peribronchial distribution extending from the suprahilar region to the apex with interval increased in solid component, 1.3 cm now, previously 8 mm\par - JAY subpleural nodule is smaller 1.2 cm, previously 1.6 cm \par \par PFTs on 8/10/22: FVC 94%, FEV1 78%, DLCO 72%\par \par Now s/p navigational bronchoscopy on 12/19/22. Path revealed negative for malignant cells, non necrotizing granulomatous inflammation. \par \par CT Chest on 4/16/23 at [facility]:\par - JAY clustered reticulonodular lung opacities with a "reverse halo" morphology, including a increased discrete 8 mm solid nodule along the superior margin\par - multiple subcentimeter calcified granulomas within the lungs\par \par Pt presents today for follow up. Admits to on/off dry cough. Denies SOB, CP.\par

## 2023-04-28 NOTE — CONSULT LETTER
[Consult Letter:] : I had the pleasure of evaluating your patient, [unfilled]. [FreeTextEntry2] : Scott Caballero MD (Pulm/ref)\gallo Ronquillo MD (PCP) [FreeTextEntry3] : Ghassan Haque MD, MPH \par System Director of Thoracic Surgery \par Director of Comprehensive Lung and Foregut [place] \par Professor Cardiovascular & Thoracic Surgery  \par [facility] [employer] [employer] at [facility]\par

## 2023-10-18 NOTE — H&P PST ADULT - FUNCTIONAL ASSESSMENT - DAILY ACTIVITY PT AGE POP HIDDEN
Adult Mirvaso Counseling: Mirvaso is a topical medication which can decrease superficial blood flow where applied. Side effects are uncommon and include stinging, redness and allergic reactions.

## (undated) DEVICE — STOPCOCK 4-WAY (BLUE) DISCOFIX SPIN-LOCK CONNECTOR

## (undated) DEVICE — SOL IRR POUR NS 0.9% 500ML

## (undated) DEVICE — VALVE BIOPSY BRONCHOVIDEOSCOPE

## (undated) DEVICE — MASK SURGICAL WITH EYESHIELD ANTIFOG (ORANGE)

## (undated) DEVICE — BALLOON SINGLE FOR BF-UC160F

## (undated) DEVICE — TUBING FLUDICS MONARCH BRONCHOSCOPE

## (undated) DEVICE — FORCEP BIOPSY BRONCHOSCOPE DISP

## (undated) DEVICE — VALVE SUCTION EVIS 160/200/240

## (undated) DEVICE — KIT INTRODUCER MONARCH BRONCHOSCOPE

## (undated) DEVICE — SYR LUER SLIP TIP 30CC

## (undated) DEVICE — VALVE SHEATH BRONCHOSCOPE STRL

## (undated) DEVICE — WARMING BLANKET LOWER ADULT

## (undated) DEVICE — ADAPTER FIBEROPTIC BRONCHOSCOPE DUAL AXIS SWIVEL

## (undated) DEVICE — BRUSH CYTO DISP

## (undated) DEVICE — DRAPE TOWEL BLUE 17" X 24"

## (undated) DEVICE — NDL ASPIRATION VIZISHOT2 22G

## (undated) DEVICE — SYR LUER LOK 20CC

## (undated) DEVICE — SYR LUER LOK 10CC

## (undated) DEVICE — PACK BRONCHOSCOPY

## (undated) DEVICE — DRSG CURITY GAUZE SPONGE 4 X 4" 12-PLY

## (undated) DEVICE — SOL INJ NS 0.9% 100ML

## (undated) DEVICE — SUTURE REMOVAL KIT

## (undated) DEVICE — GLV 7 PROTEXIS (WHITE)

## (undated) DEVICE — DRSG TAPE TRANSPORE 1"

## (undated) DEVICE — BIOPSY FORCEP J&J MONARCH SMOOTH CUP

## (undated) DEVICE — TRAP SPECIMEN SPUTUM 40CC